# Patient Record
Sex: FEMALE | URBAN - METROPOLITAN AREA
[De-identification: names, ages, dates, MRNs, and addresses within clinical notes are randomized per-mention and may not be internally consistent; named-entity substitution may affect disease eponyms.]

---

## 2021-03-17 ENCOUNTER — APPOINTMENT (RX ONLY)
Dept: URBAN - METROPOLITAN AREA CLINIC 259 | Facility: CLINIC | Age: 36
Setting detail: DERMATOLOGY
End: 2021-03-17

## 2021-03-17 DIAGNOSIS — L91.8 OTHER HYPERTROPHIC DISORDERS OF THE SKIN: ICD-10-CM

## 2021-03-17 DIAGNOSIS — L71.8 OTHER ROSACEA: ICD-10-CM

## 2021-03-17 DIAGNOSIS — L68.0 HIRSUTISM: ICD-10-CM

## 2021-03-17 PROCEDURE — ? COUNSELING

## 2021-03-17 PROCEDURE — ? PRESCRIPTION

## 2021-03-17 PROCEDURE — ? ADDITIONAL NOTES

## 2021-03-17 PROCEDURE — ? PRESCRIPTION MEDICATION MANAGEMENT

## 2021-03-17 PROCEDURE — ? PRESCRIPTION SAMPLES PROVIDED

## 2021-03-17 PROCEDURE — 99204 OFFICE O/P NEW MOD 45 MIN: CPT

## 2021-03-17 RX ORDER — OXYMETAZOLINE HYDROCHLORIDE 1 G/100G
CREAM TOPICAL
Qty: 1 | Refills: 2 | Status: ERX | COMMUNITY
Start: 2021-03-17

## 2021-03-17 RX ORDER — SPIRONOLACTONE 100 MG/1
TABLET, FILM COATED ORAL
Qty: 30 | Refills: 2 | Status: ERX | COMMUNITY
Start: 2021-03-17

## 2021-03-17 RX ORDER — IVERMECTIN 10 MG/G
CREAM TOPICAL QHS
Qty: 1 | Refills: 1 | Status: ERX | COMMUNITY
Start: 2021-03-17

## 2021-03-17 RX ADMIN — IVERMECTIN: 10 CREAM TOPICAL at 00:00

## 2021-03-17 RX ADMIN — OXYMETAZOLINE HYDROCHLORIDE: 1 CREAM TOPICAL at 00:00

## 2021-03-17 RX ADMIN — SPIRONOLACTONE: 100 TABLET, FILM COATED ORAL at 00:00

## 2021-03-17 ASSESSMENT — LOCATION SIMPLE DESCRIPTION DERM
LOCATION SIMPLE: SUBMENTAL CHIN
LOCATION SIMPLE: CHIN
LOCATION SIMPLE: LEFT LIP
LOCATION SIMPLE: NOSE
LOCATION SIMPLE: RIGHT CHEEK
LOCATION SIMPLE: LEFT CHEEK
LOCATION SIMPLE: RIGHT FOREHEAD

## 2021-03-17 ASSESSMENT — LOCATION DETAILED DESCRIPTION DERM
LOCATION DETAILED: LEFT UPPER CUTANEOUS LIP
LOCATION DETAILED: SUBMENTAL CHIN
LOCATION DETAILED: NASAL SUPRATIP
LOCATION DETAILED: RIGHT INFERIOR CENTRAL MALAR CHEEK
LOCATION DETAILED: LEFT CHIN
LOCATION DETAILED: LEFT INFERIOR MEDIAL MALAR CHEEK
LOCATION DETAILED: RIGHT MEDIAL FOREHEAD

## 2021-03-17 ASSESSMENT — LOCATION ZONE DERM
LOCATION ZONE: FACE
LOCATION ZONE: NOSE
LOCATION ZONE: LIP

## 2021-03-17 NOTE — PROCEDURE: PRESCRIPTION MEDICATION MANAGEMENT
Detail Level: Zone
Initiate Treatment: Soolantra QHS\\nRhofade QAM
Render In Strict Bullet Format?: No

## 2021-03-17 NOTE — PROCEDURE: ADDITIONAL NOTES
Additional Notes: Discussed $140 cosmetic removal fee, patient declines today
Render Risk Assessment In Note?: no
Detail Level: Simple
Additional Notes: Patient consent was obtained to proceed with the visit and recommended plan of care after discussion of all risks and benefits, including the risks of COVID-19 exposure.

## 2021-04-14 ENCOUNTER — RX ONLY (OUTPATIENT)
Age: 36
Setting detail: RX ONLY
End: 2021-04-14

## 2021-04-14 ENCOUNTER — APPOINTMENT (RX ONLY)
Dept: URBAN - METROPOLITAN AREA CLINIC 259 | Facility: CLINIC | Age: 36
Setting detail: DERMATOLOGY
End: 2021-04-14

## 2021-04-14 DIAGNOSIS — L68.0 HIRSUTISM: ICD-10-CM

## 2021-04-14 DIAGNOSIS — T300 BLISTERS, EPIDERMAL LOSS [SECOND DEGREE], UNSPECIFIED SITE: ICD-10-CM

## 2021-04-14 DIAGNOSIS — L92.3 FOREIGN BODY GRANULOMA OF THE SKIN AND SUBCUTANEOUS TISSUE: ICD-10-CM

## 2021-04-14 DIAGNOSIS — L71.8 OTHER ROSACEA: ICD-10-CM

## 2021-04-14 PROBLEM — T25.221A BURN OF SECOND DEGREE OF RIGHT FOOT, INITIAL ENCOUNTER: Status: ACTIVE | Noted: 2021-04-14

## 2021-04-14 PROCEDURE — ? FOREIGN BODY REMOVAL

## 2021-04-14 PROCEDURE — ? PRESCRIPTION

## 2021-04-14 PROCEDURE — 10060 I&D ABSCESS SIMPLE/SINGLE: CPT

## 2021-04-14 PROCEDURE — 99214 OFFICE O/P EST MOD 30 MIN: CPT | Mod: 25

## 2021-04-14 PROCEDURE — ? COUNSELING

## 2021-04-14 PROCEDURE — ? PRESCRIPTION MEDICATION MANAGEMENT

## 2021-04-14 PROCEDURE — ? INCISION AND DRAINAGE

## 2021-04-14 PROCEDURE — 10120 INC&RMVL FB SUBQ TISS SMPL: CPT

## 2021-04-14 PROCEDURE — ? ADDITIONAL NOTES

## 2021-04-14 RX ORDER — MUPIROCIN 20 MG/G
OINTMENT TOPICAL BID
Qty: 1 | Refills: 1 | Status: ERX

## 2021-04-14 RX ORDER — SPIRONOLACTONE 50 MG/1
TABLET, FILM COATED ORAL QD
Qty: 90 | Refills: 3 | Status: ERX

## 2021-04-14 ASSESSMENT — LOCATION ZONE DERM
LOCATION ZONE: FEET
LOCATION ZONE: LIP
LOCATION ZONE: FINGER
LOCATION ZONE: NOSE
LOCATION ZONE: FACE

## 2021-04-14 ASSESSMENT — LOCATION SIMPLE DESCRIPTION DERM
LOCATION SIMPLE: LEFT MIDDLE FINGER
LOCATION SIMPLE: RIGHT FOREHEAD
LOCATION SIMPLE: LEFT CHEEK
LOCATION SIMPLE: LEFT LIP
LOCATION SIMPLE: CHIN
LOCATION SIMPLE: RIGHT CHEEK
LOCATION SIMPLE: RIGHT FOOT
LOCATION SIMPLE: NOSE

## 2021-04-14 ASSESSMENT — LOCATION DETAILED DESCRIPTION DERM
LOCATION DETAILED: RIGHT INFERIOR CENTRAL MALAR CHEEK
LOCATION DETAILED: NASAL SUPRATIP
LOCATION DETAILED: LEFT INFERIOR MEDIAL MALAR CHEEK
LOCATION DETAILED: LEFT MIDDLE FINGERTIP
LOCATION DETAILED: LEFT UPPER CUTANEOUS LIP
LOCATION DETAILED: RIGHT MEDIAL FOREHEAD
LOCATION DETAILED: LEFT CHIN
LOCATION DETAILED: RIGHT DORSAL FOOT

## 2021-04-14 NOTE — PROCEDURE: PRESCRIPTION MEDICATION MANAGEMENT
Render In Strict Bullet Format?: No
Initiate Treatment: Mupirocin 2% ointment BID for affected areas on the foot
Detail Level: Zone
Continue Regimen: Rhofade QAM, Soolantra QHS
Modify Regimen: Aldactone 150mg QD

## 2021-04-14 NOTE — PROCEDURE: COUNSELING
Detail Level: Detailed
Detail Level: Zone
Patient Specific Counseling (Will Not Stick From Patient To Patient): Apply mupirocin ointment daily to finger

## 2021-04-14 NOTE — PROCEDURE: INCISION AND DRAINAGE
Detail Level: Detailed
Lesion Type: Blister
Method: sterile needle
Curette: No
Size Of Lesion In Cm (Optional But May Be Required For Some Insurances): 0
Dressing: Band-Aid
Epidermal Sutures: 4-0 Ethilon
Epidermal Closure: simple interrupted
Suture Text: The incision was partially closed with
Preparation Text: The area was prepped in the usual clean fashion.
Curette Text (Optional): After the contents were expressed a curette was used to partially remove the cyst wall.
Consent was obtained and risks were reviewed including but not limited to delayed wound healing, infection, need for multiple I and D's, and pain.
Post-Care Instructions: I reviewed with the patient in detail post-care instructions. Patient should keep wound covered and call the office should any redness, pain, swelling or worsening occur.

## 2021-06-16 ENCOUNTER — APPOINTMENT (RX ONLY)
Dept: URBAN - METROPOLITAN AREA CLINIC 259 | Facility: CLINIC | Age: 36
Setting detail: DERMATOLOGY
End: 2021-06-16

## 2021-06-16 VITALS — WEIGHT: 286 LBS | HEIGHT: 70 IN | RESPIRATION RATE: 18 BRPM

## 2021-06-16 DIAGNOSIS — B07.8 OTHER VIRAL WARTS: ICD-10-CM

## 2021-06-16 DIAGNOSIS — L68.0 HIRSUTISM: ICD-10-CM | Status: IMPROVED

## 2021-06-16 DIAGNOSIS — L71.8 OTHER ROSACEA: ICD-10-CM | Status: IMPROVED

## 2021-06-16 PROCEDURE — 99214 OFFICE O/P EST MOD 30 MIN: CPT | Mod: 25

## 2021-06-16 PROCEDURE — ? PRESCRIPTION

## 2021-06-16 PROCEDURE — ? ADDITIONAL NOTES

## 2021-06-16 PROCEDURE — ? LIQUID NITROGEN

## 2021-06-16 PROCEDURE — 17110 DESTRUCTION B9 LES UP TO 14: CPT

## 2021-06-16 PROCEDURE — ? PRESCRIPTION MEDICATION MANAGEMENT

## 2021-06-16 PROCEDURE — ? COUNSELING

## 2021-06-16 RX ORDER — SPIRONOLACTONE 100 MG/1
TABLET, FILM COATED ORAL
Qty: 60 | Refills: 2 | Status: ERX

## 2021-06-16 ASSESSMENT — LOCATION ZONE DERM
LOCATION ZONE: FACE
LOCATION ZONE: FINGER
LOCATION ZONE: NOSE
LOCATION ZONE: LIP

## 2021-06-16 ASSESSMENT — LOCATION SIMPLE DESCRIPTION DERM
LOCATION SIMPLE: LEFT INDEX FINGER
LOCATION SIMPLE: LEFT CHEEK
LOCATION SIMPLE: LEFT THUMB
LOCATION SIMPLE: RIGHT FOREHEAD
LOCATION SIMPLE: LEFT LIP
LOCATION SIMPLE: CHIN
LOCATION SIMPLE: RIGHT CHEEK
LOCATION SIMPLE: NOSE

## 2021-06-16 ASSESSMENT — LOCATION DETAILED DESCRIPTION DERM
LOCATION DETAILED: LEFT PROXIMAL RADIAL THUMB
LOCATION DETAILED: LEFT UPPER CUTANEOUS LIP
LOCATION DETAILED: LEFT PROXIMAL RADIAL PALMAR INDEX FINGER
LOCATION DETAILED: RIGHT INFERIOR CENTRAL MALAR CHEEK
LOCATION DETAILED: NASAL SUPRATIP
LOCATION DETAILED: RIGHT MEDIAL FOREHEAD
LOCATION DETAILED: LEFT CHIN
LOCATION DETAILED: LEFT INFERIOR MEDIAL MALAR CHEEK
LOCATION DETAILED: LEFT DISTAL RADIAL PALMAR INDEX FINGER

## 2021-06-16 NOTE — PROCEDURE: COUNSELING
Detail Level: Zone
Patient Specific Counseling (Will Not Stick From Patient To Patient): Not to goal
Detail Level: Detailed

## 2021-06-16 NOTE — PROCEDURE: PRESCRIPTION MEDICATION MANAGEMENT
Detail Level: Zone
Render In Strict Bullet Format?: No
Continue Regimen: Rhofade QAM, Soolantra QHS
Modify Regimen: Aldactone 150mg QD
Initiate Treatment: Aldactone 100 mg bid

## 2021-07-28 ENCOUNTER — APPOINTMENT (RX ONLY)
Dept: URBAN - METROPOLITAN AREA CLINIC 259 | Facility: CLINIC | Age: 36
Setting detail: DERMATOLOGY
End: 2021-07-28

## 2021-07-28 VITALS — HEIGHT: 70 IN | WEIGHT: 286 LBS | RESPIRATION RATE: 18 BRPM

## 2021-07-28 DIAGNOSIS — L71.8 OTHER ROSACEA: ICD-10-CM | Status: INADEQUATELY CONTROLLED

## 2021-07-28 DIAGNOSIS — B07.8 OTHER VIRAL WARTS: ICD-10-CM

## 2021-07-28 DIAGNOSIS — L68.0 HIRSUTISM: ICD-10-CM | Status: IMPROVED

## 2021-07-28 PROCEDURE — ? PRESCRIPTION MEDICATION MANAGEMENT

## 2021-07-28 PROCEDURE — ? COUNSELING

## 2021-07-28 PROCEDURE — ? LIQUID NITROGEN

## 2021-07-28 PROCEDURE — 17110 DESTRUCTION B9 LES UP TO 14: CPT

## 2021-07-28 PROCEDURE — 99214 OFFICE O/P EST MOD 30 MIN: CPT | Mod: 25

## 2021-07-28 PROCEDURE — ? ADDITIONAL NOTES

## 2021-07-28 ASSESSMENT — LOCATION DETAILED DESCRIPTION DERM
LOCATION DETAILED: LEFT CHIN
LOCATION DETAILED: RIGHT INFERIOR CENTRAL MALAR CHEEK
LOCATION DETAILED: PHILTRUM
LOCATION DETAILED: LEFT PROXIMAL RADIAL THUMB
LOCATION DETAILED: LEFT INFERIOR MEDIAL MALAR CHEEK
LOCATION DETAILED: RIGHT MEDIAL FOREHEAD

## 2021-07-28 ASSESSMENT — LOCATION ZONE DERM
LOCATION ZONE: LIP
LOCATION ZONE: FINGER
LOCATION ZONE: FACE

## 2021-07-28 ASSESSMENT — LOCATION SIMPLE DESCRIPTION DERM
LOCATION SIMPLE: CHIN
LOCATION SIMPLE: LEFT CHEEK
LOCATION SIMPLE: RIGHT FOREHEAD
LOCATION SIMPLE: LEFT THUMB
LOCATION SIMPLE: RIGHT CHEEK
LOCATION SIMPLE: UPPER LIP

## 2021-07-28 ASSESSMENT — SEVERITY ASSESSMENT OVERALL AMONG ALL PATIENTS
IN YOUR EXPERIENCE, AMONG ALL PATIENTS YOU HAVE SEEN WITH THIS CONDITION, HOW SEVERE IS THIS PATIENT'S CONDITION?: MODERATE

## 2021-07-28 NOTE — PROCEDURE: COUNSELING
Detail Level: Zone
Patient Specific Counseling (Will Not Stick From Patient To Patient): Not to goal
Detail Level: Simple

## 2021-07-28 NOTE — PROCEDURE: PRESCRIPTION MEDICATION MANAGEMENT
Detail Level: Zone
Render In Strict Bullet Format?: No
Continue Regimen: Rhofade 1% cream-small amount to the face every morning. No refills needed today.\\nSoolantra 1% cream-small amount to the face every night. No refills needed today.
Continue Regimen: Spironolactone (Aldactone) 100mg-1 tablet once a day with plenty of water. No refills needed today.
Plan: Discussed lowering the dose of the Spironolactone back down to 100mg daily due to side effects from the 100mg twice a day. The patient agrees.

## 2021-07-28 NOTE — PROCEDURE: LIQUID NITROGEN
Duration Of Freeze Thaw-Cycle (Seconds): 5-10
Number Of Freeze-Thaw Cycles: 1 freeze-thaw cycle
Render Post-Care Instructions In Note?: yes
Post-Care Instructions: I reviewed with the patient in detail post-care instructions. Patient is to wear sunprotection, and avoid picking at any of the treated lesion due to its contagious nature. Patient to allow the lesion to heal up on its own and it may take 2 weeks for the lesion to blister, scab and fall off.
Medical Necessity Clause: This procedure was medically necessary because the lesions that were treated were:
Include Z78.9 (Other Specified Conditions Influencing Health Status) As An Associated Diagnosis?: No
Detail Level: Simple
Consent: The patient's consent was obtained including but not limited to risks of crusting, scabbing, blistering, scarring, darker or lighter pigmentary change, recurrence, incomplete removal and infection. The patient tolerated the liquid nitrogen treatment well and is aware it may take multiple treatments.
Medical Necessity Information: It is in your best interest to select a reason for this procedure from the list below. All of these items fulfill various CMS LCD requirements except the new and changing color options.